# Patient Record
Sex: FEMALE | Race: BLACK OR AFRICAN AMERICAN | NOT HISPANIC OR LATINO | URBAN - METROPOLITAN AREA
[De-identification: names, ages, dates, MRNs, and addresses within clinical notes are randomized per-mention and may not be internally consistent; named-entity substitution may affect disease eponyms.]

---

## 2019-11-01 ENCOUNTER — EMERGENCY (EMERGENCY)
Facility: HOSPITAL | Age: 44
LOS: 1 days | Discharge: HOME | End: 2019-11-01
Attending: EMERGENCY MEDICINE | Admitting: EMERGENCY MEDICINE
Payer: SELF-PAY

## 2019-11-01 VITALS
RESPIRATION RATE: 18 BRPM | DIASTOLIC BLOOD PRESSURE: 75 MMHG | SYSTOLIC BLOOD PRESSURE: 137 MMHG | OXYGEN SATURATION: 100 % | HEART RATE: 88 BPM | TEMPERATURE: 98 F

## 2019-11-01 PROCEDURE — 73140 X-RAY EXAM OF FINGER(S): CPT | Mod: 26,RT

## 2019-11-01 PROCEDURE — 10060 I&D ABSCESS SIMPLE/SINGLE: CPT

## 2019-11-01 PROCEDURE — 99283 EMERGENCY DEPT VISIT LOW MDM: CPT | Mod: 25

## 2019-11-01 RX ORDER — AZTREONAM 2 G
1 VIAL (EA) INJECTION
Qty: 20 | Refills: 0
Start: 2019-11-01 | End: 2019-11-10

## 2019-11-01 RX ORDER — IBUPROFEN 200 MG
600 TABLET ORAL ONCE
Refills: 0 | Status: COMPLETED | OUTPATIENT
Start: 2019-11-01 | End: 2019-11-01

## 2019-11-01 RX ORDER — ACETAMINOPHEN 500 MG
975 TABLET ORAL ONCE
Refills: 0 | Status: COMPLETED | OUTPATIENT
Start: 2019-11-01 | End: 2019-11-01

## 2019-11-01 RX ADMIN — Medication 600 MILLIGRAM(S): at 13:53

## 2019-11-01 RX ADMIN — Medication 975 MILLIGRAM(S): at 14:22

## 2019-11-01 NOTE — ED PROVIDER NOTE - CLINICAL SUMMARY MEDICAL DECISION MAKING FREE TEXT BOX
44y old Female with PMHx of HLD presents to ED s/p trauma to right middle finger 2 days ago complaining of swelling and throbbing pain. Pt is concerned that it might be infected, denies drainage, bleeding, fever and chills. Right middle finger swollen and tender on physical exam with limited range of motion. Concern for fracture; will get X-ray, concern for infection. 44y old Female with PMHx of HLD presents to ED s/p trauma to right middle finger 2 days ago complaining of swelling and throbbing pain. Likely paronychia, r/o fx. X-ray, I&D, analgesia.

## 2019-11-01 NOTE — ED ADULT TRIAGE NOTE - CHIEF COMPLAINT QUOTE
Pt slammed her right hand into a door a few days ago , Pts middle finger swollen hard and red. Pt reports pain

## 2019-11-01 NOTE — ED PROVIDER NOTE - CPE EDP GU CVA TENDER
r/ middle finger tender, swollen, limited range of motion right 3rd finger noted to be swollen with fluctuance to cuticle area. No bony tenderness. + distal pulses. No wrist or elbow tenderness.

## 2019-11-01 NOTE — ED PROVIDER NOTE - ATTENDING CONTRIBUTION TO CARE
Pt was seen and evaluated by me. Pt is a 43 y/o female with PMHx of HLD presents to ED for right 3rd finger pain/swelling X 2 days. Pt states 2 days ago she banged her finger between a cabinet door and has had some swelling and pain since. Pt denies any fever, chills, nausea, vomiting, SOB, chest pain, or abd pain. Lungs CTA b/l. RRR. Abd soft, non-tender. Noted to have swelling with fluctuance at the cuticle of the right 3rd finger. No bony tenderness. + distal pules. No wrist or elbow tenderness.    I have evaluated the patient and agree with the documentation and assessment as made by the PA. We have discussed plan of care and work up for the patient.

## 2019-11-01 NOTE — ED PROVIDER NOTE - NSFOLLOWUPINSTRUCTIONS_ED_ALL_ED_FT
Follow up with your primary care provider within 48 hours for wound check.    Take Ibuprofen 600mg every 8 hours as needed for pain.  You may also take Tylenol 650mg every 6 hours as needed for pain.    Elevate hand, soak in warm water for 15 minutes every 4 hours throughout the day.  Take Bactrim 1 pill twice a day for 10 days.    Return to ER for any new or worsening symptoms, fever, chills, increased redness, swelling, pain or any other concerns.

## 2019-11-01 NOTE — ED PROVIDER NOTE - OBJECTIVE STATEMENT
44y old Female with PMHx of HLD presents to ED s/p trauma to right middle finger complaining of throbbing pain and swelling. Pt states she banged her finger in between a closet door 2 days ago. Pt states pain has gotten worse today, and is concerned that it might be infected.  Pt denies discharge and bleeding. Denies fever, chills and cough.

## 2019-11-01 NOTE — ED PROVIDER NOTE - PROGRESS NOTE DETAILS
SOBIA MACK- received sign out from Dr. Hicks, pt pending xray with paronychia. I and D of large paronychia performed by myself, pus extracted. +swelling/erythema noted to distal right 3rd finger. no streaking, no crepitus. fingerpad nontender. pt feeling better. will start on bactrim and given return precautions, advised for wound check in 2 days by pcp or in ED. pt eloped prior to [facility] and bactrim x 1 given, called pt on cell phone, denies chance of pregnancy, not sexually active, LMP 10/25.

## 2019-11-01 NOTE — ED PROVIDER NOTE - SKIN, MLM
Skin normal color for race, warm, dry and intact. No evidence of rash. Swelling and fluctance to cuticle area of right 3rd finger

## 2019-11-01 NOTE — ED PROVIDER NOTE - PATIENT PORTAL LINK FT
You can access the FollowMyHealth Patient Portal offered by [facility] by registering at the following website: [email or web address]. By joining [facility]’s FollowMyHealth portal, you will also be able to view your health information using other applications (apps) compatible with our system.